# Patient Record
Sex: MALE | Race: WHITE | Employment: OTHER | URBAN - METROPOLITAN AREA
[De-identification: names, ages, dates, MRNs, and addresses within clinical notes are randomized per-mention and may not be internally consistent; named-entity substitution may affect disease eponyms.]

---

## 2018-09-20 ENCOUNTER — HOSPITAL ENCOUNTER (INPATIENT)
Age: 68
LOS: 1 days | Discharge: HOME | DRG: 309 | End: 2018-09-21
Attending: INTERNAL MEDICINE | Admitting: INTERNAL MEDICINE
Payer: COMMERCIAL

## 2018-09-20 ENCOUNTER — APPOINTMENT (OUTPATIENT)
Dept: GENERAL RADIOLOGY | Age: 68
End: 2018-09-20
Payer: COMMERCIAL

## 2018-09-20 ENCOUNTER — HOSPITAL ENCOUNTER (EMERGENCY)
Age: 68
Discharge: HOME | End: 2018-09-20
Attending: EMERGENCY MEDICINE
Payer: COMMERCIAL

## 2018-09-20 VITALS
OXYGEN SATURATION: 96 % | BODY MASS INDEX: 32.85 KG/M2 | TEMPERATURE: 97.4 F | HEART RATE: 73 BPM | RESPIRATION RATE: 13 BRPM | WEIGHT: 249 LBS | DIASTOLIC BLOOD PRESSURE: 73 MMHG | SYSTOLIC BLOOD PRESSURE: 139 MMHG

## 2018-09-20 DIAGNOSIS — R00.1 BRADYCARDIA: Primary | ICD-10-CM

## 2018-09-20 DIAGNOSIS — R55 NEAR SYNCOPE: ICD-10-CM

## 2018-09-20 PROBLEM — E78.5 HLD (HYPERLIPIDEMIA): Status: ACTIVE | Noted: 2018-09-20

## 2018-09-20 LAB
A/G RATIO: 1.3 (ref 1.1–2.2)
ALBUMIN SERPL-MCNC: 3.9 G/DL (ref 3.4–5)
ALP BLD-CCNC: 108 U/L (ref 40–129)
ALT SERPL-CCNC: 26 U/L (ref 10–40)
ANION GAP SERPL CALCULATED.3IONS-SCNC: 12 MMOL/L (ref 3–16)
AST SERPL-CCNC: 24 U/L (ref 15–37)
BASOPHILS ABSOLUTE: 0 K/UL (ref 0–0.2)
BASOPHILS RELATIVE PERCENT: 0.5 %
BILIRUB SERPL-MCNC: 0.9 MG/DL (ref 0–1)
BUN BLDV-MCNC: 34 MG/DL (ref 7–20)
CALCIUM SERPL-MCNC: 9.1 MG/DL (ref 8.3–10.6)
CHLORIDE BLD-SCNC: 103 MMOL/L (ref 99–110)
CO2: 23 MMOL/L (ref 21–32)
CREAT SERPL-MCNC: 1.7 MG/DL (ref 0.8–1.3)
EOSINOPHILS ABSOLUTE: 0.2 K/UL (ref 0–0.6)
EOSINOPHILS RELATIVE PERCENT: 2.1 %
GFR AFRICAN AMERICAN: 48
GFR NON-AFRICAN AMERICAN: 40
GLOBULIN: 3.1 G/DL
GLUCOSE BLD-MCNC: 153 MG/DL (ref 70–99)
HCT VFR BLD CALC: 49.8 % (ref 40.5–52.5)
HEMOGLOBIN: 17.2 G/DL (ref 13.5–17.5)
LYMPHOCYTES ABSOLUTE: 2.1 K/UL (ref 1–5.1)
LYMPHOCYTES RELATIVE PERCENT: 23.9 %
MCH RBC QN AUTO: 30.9 PG (ref 26–34)
MCHC RBC AUTO-ENTMCNC: 34.6 G/DL (ref 31–36)
MCV RBC AUTO: 89.2 FL (ref 80–100)
MONOCYTES ABSOLUTE: 0.3 K/UL (ref 0–1.3)
MONOCYTES RELATIVE PERCENT: 3.9 %
NEUTROPHILS ABSOLUTE: 6.1 K/UL (ref 1.7–7.7)
NEUTROPHILS RELATIVE PERCENT: 69.6 %
PDW BLD-RTO: 13.4 % (ref 12.4–15.4)
PLATELET # BLD: 286 K/UL (ref 135–450)
PMV BLD AUTO: 8.9 FL (ref 5–10.5)
POTASSIUM SERPL-SCNC: 3.4 MMOL/L (ref 3.5–5.1)
RBC # BLD: 5.58 M/UL (ref 4.2–5.9)
SODIUM BLD-SCNC: 138 MMOL/L (ref 136–145)
TOTAL PROTEIN: 7 G/DL (ref 6.4–8.2)
TROPONIN: <0.01 NG/ML
WBC # BLD: 8.8 K/UL (ref 4–11)

## 2018-09-20 PROCEDURE — 85025 COMPLETE CBC W/AUTO DIFF WBC: CPT

## 2018-09-20 PROCEDURE — 93010 ELECTROCARDIOGRAM REPORT: CPT | Performed by: INTERNAL MEDICINE

## 2018-09-20 PROCEDURE — 36415 COLL VENOUS BLD VENIPUNCTURE: CPT

## 2018-09-20 PROCEDURE — 80053 COMPREHEN METABOLIC PANEL: CPT

## 2018-09-20 PROCEDURE — 1200000000 HC SEMI PRIVATE

## 2018-09-20 PROCEDURE — 71046 X-RAY EXAM CHEST 2 VIEWS: CPT

## 2018-09-20 PROCEDURE — 2580000003 HC RX 258: Performed by: NURSE PRACTITIONER

## 2018-09-20 PROCEDURE — 84484 ASSAY OF TROPONIN QUANT: CPT

## 2018-09-20 PROCEDURE — G0378 HOSPITAL OBSERVATION PER HR: HCPCS

## 2018-09-20 PROCEDURE — 99285 EMERGENCY DEPT VISIT HI MDM: CPT

## 2018-09-20 PROCEDURE — 93005 ELECTROCARDIOGRAM TRACING: CPT | Performed by: EMERGENCY MEDICINE

## 2018-09-20 PROCEDURE — 6370000000 HC RX 637 (ALT 250 FOR IP): Performed by: NURSE PRACTITIONER

## 2018-09-20 RX ORDER — SODIUM CHLORIDE 9 MG/ML
INJECTION, SOLUTION INTRAVENOUS CONTINUOUS
Status: DISCONTINUED | OUTPATIENT
Start: 2018-09-20 | End: 2018-09-21 | Stop reason: HOSPADM

## 2018-09-20 RX ORDER — ONDANSETRON 2 MG/ML
4 INJECTION INTRAMUSCULAR; INTRAVENOUS EVERY 6 HOURS PRN
Status: DISCONTINUED | OUTPATIENT
Start: 2018-09-20 | End: 2018-09-21 | Stop reason: HOSPADM

## 2018-09-20 RX ORDER — ASPIRIN 81 MG/1
81 TABLET ORAL DAILY
Status: DISCONTINUED | OUTPATIENT
Start: 2018-09-20 | End: 2018-09-21 | Stop reason: HOSPADM

## 2018-09-20 RX ORDER — ATORVASTATIN CALCIUM 80 MG/1
80 TABLET, FILM COATED ORAL DAILY
Status: DISCONTINUED | OUTPATIENT
Start: 2018-09-21 | End: 2018-09-20

## 2018-09-20 RX ORDER — SODIUM CHLORIDE 0.9 % (FLUSH) 0.9 %
10 SYRINGE (ML) INJECTION PRN
Status: DISCONTINUED | OUTPATIENT
Start: 2018-09-20 | End: 2018-09-21 | Stop reason: HOSPADM

## 2018-09-20 RX ORDER — ASPIRIN 81 MG/1
81 TABLET ORAL DAILY
Status: DISCONTINUED | OUTPATIENT
Start: 2018-09-21 | End: 2018-09-20

## 2018-09-20 RX ORDER — SODIUM CHLORIDE 0.9 % (FLUSH) 0.9 %
10 SYRINGE (ML) INJECTION EVERY 12 HOURS SCHEDULED
Status: DISCONTINUED | OUTPATIENT
Start: 2018-09-20 | End: 2018-09-21 | Stop reason: HOSPADM

## 2018-09-20 RX ORDER — ATORVASTATIN CALCIUM 80 MG/1
80 TABLET, FILM COATED ORAL DAILY
Status: DISCONTINUED | OUTPATIENT
Start: 2018-09-20 | End: 2018-09-21 | Stop reason: HOSPADM

## 2018-09-20 RX ADMIN — SODIUM CHLORIDE: 9 INJECTION, SOLUTION INTRAVENOUS at 22:04

## 2018-09-20 RX ADMIN — ATORVASTATIN CALCIUM 80 MG: 80 TABLET, FILM COATED ORAL at 23:07

## 2018-09-20 RX ADMIN — ASPIRIN 81 MG: 81 TABLET ORAL at 23:07

## 2018-09-20 RX ADMIN — SODIUM CHLORIDE, PRESERVATIVE FREE 10 ML: 5 INJECTION INTRAVENOUS at 22:04

## 2018-09-20 NOTE — ED NOTES
1701-Transfer center paged Dr. Usman Gupta Hospitalist for Dr. Quincy Bledsoe.       Tayo  09/20/18 1704

## 2018-09-20 NOTE — ED NOTES
173-[facility] [facility] Hospitalist Dr. Earle Blizzard called back and is currently speaking to Dr. Joana Wilson regarding the pt.       Tayo  09/20/18 3732

## 2018-09-20 NOTE — ED NOTES
6412-Paged Dr. Albania Gaffney for Dr. Betina Rangel called back and spoke with Dr. Diana Valdez regarding the pt.       Tayo  09/20/18 [id number]

## 2018-09-20 NOTE — ED PROVIDER NOTES
SpO2 Height Weight   09/20/18 1438 09/20/18 1442 09/20/18 1442 09/20/18 1438 09/20/18 1438 09/20/18 1438 -- 09/20/18 1442   108/63 97.4 °F (36.3 °C) Oral 75 13 92 %  249 lb (112.9 kg)     General :Patient is awake alert oriented person place and time no acute distress nontoxic appearing  HEENT:  Pupils are equally round and reactive to light extraocular motors are intact conjunctivae clear sclerae white there is no injection no icterus. No obvious signs of papilledema. Nose without any rhinorrhea or epistaxis. Oral mucosa is moist no exudate buccal mucosa shows no ulcerations. Uvula is midline  Neck:  Neck is supple full range of motion trachea midline thyroid nonpalpable  Cardiac: Heart regular rate rhythm no murmurs rubs clicks or gallops, the patient does not report any sudden onset pain no tearing pain in chest abdomin or back, there is no migratory pain nor pulse inequalities in the bilateral femoral or radial pulses. there are no concurrent neurological symptoms   Lungs:  Lungs are clear to auscultation there is no wheezing rhonchi or rales. There is no use of accessory muscles no nasal flaring identified. Chest wall: There is no tenderness to palpation over the chest wall or over ribs  Abdomen:  Abdomen is soft nontender nondistended. There is no firm or pulsatile masses no rebound rigidity or guarding negative Enriquez's negative McBurney  Suprapubic;  there is no tenderness to palpation over the external bladder   Musculoskeletal:  5 out of 5 strength in all 4 extremities full flexion extension abduction and adduction supination pronation of all extremities and all digits. No obvious muscle atrophy is noted. No focal muscle deficits are appreciated. Posterior tibial artery is palpable pulses are intact equal bilaterally Refill less than 2 seconds lower extremity is warm and viable  Neuro:   Motor intact sensory intact cranial nerves II through XII are intact level of consciousness is normal Rate 74 BPM    P-R Interval 160 ms    QRS Duration 92 ms    Q-T Interval 404 ms    QTc Calculation (Bazett) 448 ms    P Axis 43 degrees    R Axis 44 degrees    T Axis 58 degrees    Diagnosis       Sinus rhythm with Premature supraventricular complexesOtherwise normal ECGConfirmed by Jody Mancini MD, Deondre Ga ([id number]) on 9/20/2018 3:37:38 PM     Radiographs:  Xr Chest Standard (2 Vw)    Result Date: 9/20/2018  EXAMINATION: TWO VIEWS OF THE CHEST 9/20/2018 2:52 pm COMPARISON: None. HISTORY: ORDERING SYSTEM PROVIDED HISTORY: cp TECHNOLOGIST PROVIDED HISTORY: Reason for exam:->cp Ordering Physician Provided Reason for Exam: Dizziness (pt was outside playing golf today. pt got dizzy and hot and wasnt feeling well. squad states pt had a near syncopal episode en route and dwight'd down to 36. given atropine. pt arrives nsr with hr 74. a/ox4) Acuity: Acute Type of Exam: Initial FINDINGS: The cardiac silhouette, mediastinal and hilar contours are normal.  The lungs are clear. There are no pleural effusions. No acute osseous abnormalities are identified. There is moderate spondylosis in the mid and lower thoracic spine. No acute cardiopulmonary disease. Procedures/EKG:   EKG interpreted by myself shows sinus rhythm with PVCs no acute ST elevation is identified no old EKG available for comparison    ED Course and MDM   In brief, Susan Novak is a 77 y.o. male who presented to the emergency department With near syncope patient was bradycardic with EMS arrival and 36 atropine now feels improved. CBC is normal CMP is grossly normal BUN 34 creatinine 1.7 troponin is negative chest x-rays negative case discussed with cardiologist Dr. Orlando Freed percent patient transferred to Hospital observation for possible pacemaker placement.   Patient transferred in stable condition  CRITICAL CARE TIME    CRITICAL CARE STATEMENT:   There was a high probability of life-threatening clinical deterioration in the patient's condition requiring my urgent intervention. The services I provided to this patient were to treat and/or prevent clinically significant deterioration that could result in:   __respiratory failure   __neurologic failure   _X_circulatory failure/ACS/ Chest pain/ Unstable angina  __ Sever Blood pressure abnormality requiring intervention   __overwhelming infection/ possible urgent surgical situation   __renal failure   __severe shock   __metabolic failure   __multi-organ failure   __preventing self harm/harm to staff or other Psychiatric  Services included the following: chart data review, reviewing nursing notes and/or old charts, documentation time, consultant collaboration regarding findings and treatment options, medication orders and management, direct patient care, re-evaluations, vital sign assessments and ordering, interpreting and reviewing diagnostic studies/lab tests. Aggregate critical care time was 35 minutes, which includes only time during which I was engaged in work directly related to the patient's care, as described above, whether at the bedside or elsewhere in the Emergency Department, or in consultation with specialist. It did not include time spent performing other reported procedures or the services of residents, students, nurses or physician assistants and is in addition to and exclusion of all other seperatly billable charges and procedures. This does include blood pressure monitoring, monitoring of other vital signs as well as frequent patient reassessment for any deterioration in their condition. ED Medication Orders     None          Final Impression      1. Bradycardia    2.  Near syncope      DISPOSITION Decision To Admit 09/20/2018 04:00:32 PM     (Please note that portions of this note may have been completed with a voice recognition program. Efforts were made to edit the dictations but occasionally words are mis-transcribed.)    Rory Hollingsworth,

## 2018-09-20 NOTE — ED NOTES
Bed: 01  Expected date:   Expected time:   Means of arrival:   Comments:  Joss Choudhary  09/20/18 1428

## 2018-09-21 VITALS
TEMPERATURE: 97.9 F | HEIGHT: 73 IN | WEIGHT: 253 LBS | DIASTOLIC BLOOD PRESSURE: 79 MMHG | RESPIRATION RATE: 16 BRPM | SYSTOLIC BLOOD PRESSURE: 122 MMHG | BODY MASS INDEX: 33.53 KG/M2 | OXYGEN SATURATION: 95 % | HEART RATE: 61 BPM

## 2018-09-21 DIAGNOSIS — R00.1 BRADYCARDIA: Primary | ICD-10-CM

## 2018-09-21 LAB
ANION GAP SERPL CALCULATED.3IONS-SCNC: 10 MMOL/L (ref 3–16)
BASOPHILS ABSOLUTE: 0.1 K/UL (ref 0–0.2)
BASOPHILS RELATIVE PERCENT: 0.7 %
BUN BLDV-MCNC: 34 MG/DL (ref 7–20)
CALCIUM SERPL-MCNC: 8.2 MG/DL (ref 8.3–10.6)
CHLORIDE BLD-SCNC: 105 MMOL/L (ref 99–110)
CO2: 23 MMOL/L (ref 21–32)
CREAT SERPL-MCNC: 1.3 MG/DL (ref 0.8–1.3)
EKG ATRIAL RATE: 74 BPM
EKG DIAGNOSIS: NORMAL
EKG P AXIS: 43 DEGREES
EKG P-R INTERVAL: 160 MS
EKG Q-T INTERVAL: 404 MS
EKG QRS DURATION: 92 MS
EKG QTC CALCULATION (BAZETT): 448 MS
EKG R AXIS: 44 DEGREES
EKG T AXIS: 58 DEGREES
EKG VENTRICULAR RATE: 74 BPM
EOSINOPHILS ABSOLUTE: 0.4 K/UL (ref 0–0.6)
EOSINOPHILS RELATIVE PERCENT: 4.8 %
GFR AFRICAN AMERICAN: >60
GFR NON-AFRICAN AMERICAN: 54
GLUCOSE BLD-MCNC: 100 MG/DL (ref 70–99)
HCT VFR BLD CALC: 42.2 % (ref 40.5–52.5)
HEMOGLOBIN: 14.5 G/DL (ref 13.5–17.5)
LV EF: 58 %
LVEF MODALITY: NORMAL
LYMPHOCYTES ABSOLUTE: 1.8 K/UL (ref 1–5.1)
LYMPHOCYTES RELATIVE PERCENT: 20.5 %
MCH RBC QN AUTO: 30.5 PG (ref 26–34)
MCHC RBC AUTO-ENTMCNC: 34.2 G/DL (ref 31–36)
MCV RBC AUTO: 89 FL (ref 80–100)
MONOCYTES ABSOLUTE: 0.4 K/UL (ref 0–1.3)
MONOCYTES RELATIVE PERCENT: 4.2 %
NEUTROPHILS ABSOLUTE: 6.1 K/UL (ref 1.7–7.7)
NEUTROPHILS RELATIVE PERCENT: 69.8 %
PDW BLD-RTO: 13.2 % (ref 12.4–15.4)
PLATELET # BLD: 202 K/UL (ref 135–450)
PMV BLD AUTO: 7.9 FL (ref 5–10.5)
POTASSIUM REFLEX MAGNESIUM: 3.9 MMOL/L (ref 3.5–5.1)
RBC # BLD: 4.75 M/UL (ref 4.2–5.9)
SODIUM BLD-SCNC: 138 MMOL/L (ref 136–145)
TROPONIN: <0.01 NG/ML
TROPONIN: <0.01 NG/ML
WBC # BLD: 8.7 K/UL (ref 4–11)

## 2018-09-21 PROCEDURE — 36415 COLL VENOUS BLD VENIPUNCTURE: CPT

## 2018-09-21 PROCEDURE — 6370000000 HC RX 637 (ALT 250 FOR IP): Performed by: NURSE PRACTITIONER

## 2018-09-21 PROCEDURE — 99223 1ST HOSP IP/OBS HIGH 75: CPT | Performed by: INTERNAL MEDICINE

## 2018-09-21 PROCEDURE — G0378 HOSPITAL OBSERVATION PER HR: HCPCS

## 2018-09-21 PROCEDURE — 93880 EXTRACRANIAL BILAT STUDY: CPT

## 2018-09-21 PROCEDURE — 84484 ASSAY OF TROPONIN QUANT: CPT

## 2018-09-21 PROCEDURE — 80048 BASIC METABOLIC PNL TOTAL CA: CPT

## 2018-09-21 PROCEDURE — 93306 TTE W/DOPPLER COMPLETE: CPT | Performed by: INTERNAL MEDICINE

## 2018-09-21 PROCEDURE — 85025 COMPLETE CBC W/AUTO DIFF WBC: CPT

## 2018-09-21 RX ADMIN — ASPIRIN 81 MG: 81 TABLET ORAL at 08:16

## 2018-09-21 ASSESSMENT — PAIN SCALES - GENERAL
PAINLEVEL_OUTOF10: 0

## 2018-09-21 NOTE — DISCHARGE SUMMARY
------------------------------------------------------------------  Blood Pressure:Right arm 146/65 mmHg. Left arm 150/78 mmHg. Patient Status:Routine. Study [facility] - Vascular Lab. Technical Quality:Adequate visualization. Risk Factors History +----------------------+----------+------------------------------------------+ ! Diagnosis             ! Date      ! Comments                                  ! +----------------------+----------+------------------------------------------+ ! Carotid Endarterectomy!02/24/2015! Right                                     ! +----------------------+----------+------------------------------------------+ ! Other                 !09/21/2018! Presented to ED yesterday after becoming  ! !                      !          !dizzy and diaphoretic. Patient had near   ! !                      !          !syncopal episode en route to ED. ! +----------------------+----------+------------------------------------------+ ! Other                 ! !4-: ICA RT 1 - 15%, ICA LT 50 -    ! !                      !          !80%, ECA LT > 50%                         ! +----------------------+----------+------------------------------------------+   - The patient's risk factor(s) include: dyslipidemia and arterial     hypertension. Plaque   - A plaque was found in the Right Prox ICA. The plaque characteristics are: minimal severity and homogeneous texture. - A plaque was found in the Left Prox ICA. The plaque characteristics are: moderate severity and heterogeneous texture. There is evidence of calcified plaque. - A plaque was found in the Left Dist CCA. The plaque characteristics are: moderate severity and heterogeneous texture. There is evidence of calcified plaque. Velocities are measured in cm/s ; Diameters are measured in mm Carotid Right Measurements +---------------+----+----+-----+----+ ! Location       ! PSV ! EDV ! Angle! RI  ! +---------------+----+----+-----+----+ ! Prox CCA       !143 !17. 2! 60   !0.88! +---------------+----+----+-----+----+ ! Mid CCA        !113 !23. 5!60   !0.79! +---------------+----+----+-----+----+ ! Dist CCA       !98. 3!17. 7! 60   !0.82! +---------------+----+----+-----+----+ ! Prox ICA       !83.2!22. 4!60   !0.73! +---------------+----+----+-----+----+ ! Mid ICA        !93.8!29. 8!60   !0.68! +---------------+----+----+-----+----+ ! Dist ICA       !95.7!32. 9!60   !0.66! +---------------+----+----+-----+----+ ! Prox ECA       !129 !    !61   !    ! +---------------+----+----+-----+----+ ! Vertebral      !20  !7.6 ! 60   !0.62! +---------------+----+----+-----+----+ ! Prox Subclavian! 150 !    !60   !    ! +---------------+----+----+-----+----+   - There is antegrade vertebral flow noted on the right side. - Additional Measurements:ICAPSV/CCAPSV 0.85. ICAEDV/CCAEDV 1.91. Carotid Left Measurements +---------------+----+----+-----+----+ ! Location       ! PSV ! EDV ! Angle! RI  ! +---------------+----+----+-----+----+ ! Prox CCA       !116 !18. 6! 60   !0.84! +---------------+----+----+-----+----+ ! Mid CCA        !99.4!21. 7!60   !0.78! +---------------+----+----+-----+----+ ! Dist CCA       !114 !23. 5!60   !0.79! +---------------+----+----+-----+----+ ! Prox ICA       ! 147 !28. 1! 60   !0.81! +---------------+----+----+-----+----+ ! Mid ICA        !103 !21. 2! 60   !0.79! +---------------+----+----+-----+----+ ! Dist ICA       !80.6!26. 5!60   !0.67! +---------------+----+----+-----+----+ ! Prox ECA       !339 !    !61   !    ! +---------------+----+----+-----+----+ ! Vertebral      !28. 4!10. 7! 60   !0.62! +---------------+----+----+-----+----+ ! Prox Subclavian! 108 !    !60   !    ! +---------------+----+----+-----+----+   - There is antegrade vertebral flow noted on the left side. - Additional Measurements:ICAPSV/CCAPSV 1.48. ICAEDV/CCAEDV 1.51. Consults:     IP CONSULT TO CARDIOLOGY    Labs:  For convenience and continuity at follow-up the

## 2018-09-21 NOTE — H&P
Prophylaxis: Lovenox  Diet: DIET CARDIAC;  Code Status: Full Code    PT/OT Eval Status: No indication for need at this time    Dispo - 1-2 days pending clinical improvement       FAINA Jung - CNP    Thank you Lin Velásquez for the opportunity to be involved in this patient's care.  If you have any questions or concerns please feel free to contact me at [phone].  --------------------------------Dr. Bk Oswald-----------------------------------------------

## 2018-09-21 NOTE — CONSULTS
Extraocular muscles are intact. NECK:  Supple. Carotids are brisk. There is left carotid bruit. There is  no lymphadenopathy or thyromegaly. LUNGS:  Clear to auscultation. Percussion is resonant. HEART:  S1 and S2.  Regular rate and rhythm. There is no murmur. There is  no gallop. PMI is nondisplaced. ABDOMEN:  Positive bowel sounds. Soft and nontender. There is no  organomegaly. SKIN:  No skin rash, no pigmentation. Normal skin texture and turgor. EXTREMITIES:  There is no edema. There is no cyanosis. There is no  deformity in the hand or feet. PSYCHIATRIC:  Normal mood and affect. Alert and oriented x3. NEUROLOGIC:  Normal gross motor and sensory exam.      DIAGNOSTIC WORKUP:  Potassium 3.9, creatinine is 1.3, creatinine yesterday  was 1.7, BUN is 34. Hemoglobin is 14, hematocrit 42, platelets are 535 and  WBCs 8.7. EKG done yesterday revealed a sinus rhythm with premature supraventricular  complexes. Chest x-ray revealed no acute cardiopulmonary process. ASSESSMENT:  1. Transient bradycardia and presyncope due to volume depletion. He was  dehydrated with BUN of 34 and creatinine of 1.7, which has improved after  hydration. 2.  Carotid artery stenosis, s/p right carotid endarterectomy and moderate  disease of left carotid. 3.  Hyperlipidemia. RECOMMENDATION:  I will suggest we place the patient on a 30-day event  monitor to rule out marked bradycardia that could be causing his symptom. Will also obtain echocardiogram to rule out structural heart disease. He  has been hydrated and his creatinine has improved. It looks like he has  chronic kidney disease as his creatinine is normally 1.3. Will also  obtain a surveillance duplex for the carotid disease. Thank you for the consultation. We will continue to follow the patient  with you.         Vanessa Weaver MD    D: 09/21/2018 11:48:17       T: 09/21/2018 12:50:10     [id number]  Job#: [id number]     Doc#: [id number]    CC:  Marylu Lawton MD

## 2018-11-02 ENCOUNTER — OFFICE VISIT (OUTPATIENT)
Dept: CARDIOLOGY CLINIC | Age: 68
End: 2018-11-02
Payer: COMMERCIAL

## 2018-11-02 VITALS
BODY MASS INDEX: 33.56 KG/M2 | SYSTOLIC BLOOD PRESSURE: 136 MMHG | WEIGHT: 253.2 LBS | DIASTOLIC BLOOD PRESSURE: 80 MMHG | HEIGHT: 73 IN | OXYGEN SATURATION: 96 % | HEART RATE: 69 BPM

## 2018-11-02 DIAGNOSIS — R00.1 BRADYCARDIA: Primary | ICD-10-CM

## 2018-11-02 DIAGNOSIS — I77.9 BILATERAL CAROTID ARTERY DISEASE, UNSPECIFIED TYPE (HCC): ICD-10-CM

## 2018-11-02 PROCEDURE — 99214 OFFICE O/P EST MOD 30 MIN: CPT | Performed by: INTERNAL MEDICINE

## 2018-11-02 NOTE — LETTER
This note was scribed in the presence of Morgan Rodriguez MD, by Felice Melendrez RN.      I, Dr. Morgan Rodriguez, personally performed the services described in this documentation, as scribed by the above signed scribe in my presence. It is both accurate and complete to my knowledge. I agree with the details independently gathered by the clinical support staff, while the remaining scribed note accurately describes my personal service to the patient. All questions and concerns were addressed to the patient/family. Alternatives to my treatment were discussed. The note was completed using EMR. Every effort was made to ensure accuracy; however, inadvertent computerized transcription errors may be present. Morgan Rodriguez MD, [facility] [address], [place], [place]  [phone] [facility]  [phone] [facility]  11/2/2018  10:10 AM       If you have questions, please do not hesitate to call me. I look forward to following Pedrito Nino along with you.     Sincerely,        Morgan Rodriguez MD

## 2018-11-02 NOTE — PROGRESS NOTES
Date Taking? Authorizing Provider   aspirin 81 MG tablet Take 81 mg by mouth daily. Yes Historical Provider, MD   atorvastatin (LIPITOR) 80 MG tablet Take 80 mg by mouth daily. Yes Historical Provider, MD       In-patient schedule medications:        Infusion Medications: Allergies:  Patient has no known allergies. Review of Systems:   All 14 point review of symptoms completed. Pertinent positives identified in the HPI, all other review of symptoms findings as below.      Review of Systems - History obtained from the patient  General ROS: negative for - chills, fever or night sweats  Psychological ROS: negative for - disorientation or hallucinations  Ophthalmic ROS: negative for - dry eyes, eye pain or loss of vision  ENT ROS: negative for - nasal discharge or sore throat  Allergy and Immunology ROS: negative for - hives or itchy/watery eyes  Hematological and Lymphatic ROS: negative for - jaundice or night sweats  Endocrine ROS: negative for - mood swings or temperature intolerance  Breast ROS: deferred  Respiratory ROS: negative for - hemoptysis or stridor  Cardiovascular ROS: negative for - chest pain, dyspnea on exertion or palpitations  Gastrointestinal ROS: no abdominal pain, change in bowel habits, or black or bloody stools  Genito-Urinary ROS: no dysuria, trouble voiding, or hematuria  Musculoskeletal ROS: negative for - gait disturbance, joint pain or joint stiffness  Neurological ROS: negative for - seizures or speech problems  Dermatological ROS: negative for - rash or skin lesion changes      Physical Examination:    /80   Pulse 69   Ht 6' 1\" (1.854 m)   Wt 253 lb 3.2 oz (114.9 kg)   SpO2 96%   BMI 33.41 kg/m²   /80   Pulse 69   Ht 6' 1\" (1.854 m)   Wt 253 lb 3.2 oz (114.9 kg)   SpO2 96%   BMI 33.41 kg/m²    Weight: 253 lb 3.2 oz (114.9 kg)     Wt Readings from Last 3 Encounters:   11/02/18 253 lb 3.2 oz (114.9 kg)   09/21/18 253 lb (114.8 kg)   09/20/18 249 lb (112.9

## 2022-07-19 ENCOUNTER — HOSPITAL ENCOUNTER (OUTPATIENT)
Age: 72
Setting detail: OUTPATIENT SURGERY
Discharge: HOME | End: 2022-07-19
Attending: INTERNAL MEDICINE | Admitting: INTERNAL MEDICINE
Payer: COMMERCIAL

## 2022-07-19 ENCOUNTER — ANESTHESIA EVENT (OUTPATIENT)
Dept: ENDOSCOPY | Age: 72
End: 2022-07-19
Payer: COMMERCIAL

## 2022-07-19 ENCOUNTER — ANESTHESIA (OUTPATIENT)
Dept: ENDOSCOPY | Age: 72
End: 2022-07-19
Payer: COMMERCIAL

## 2022-07-19 VITALS
RESPIRATION RATE: 18 BRPM | BODY MASS INDEX: 32.47 KG/M2 | WEIGHT: 245 LBS | HEIGHT: 73 IN | OXYGEN SATURATION: 93 % | SYSTOLIC BLOOD PRESSURE: 134 MMHG | TEMPERATURE: 97.4 F | DIASTOLIC BLOOD PRESSURE: 81 MMHG | HEART RATE: 60 BPM

## 2022-07-19 DIAGNOSIS — Z12.11 ENCOUNTER FOR SCREENING COLONOSCOPY: ICD-10-CM

## 2022-07-19 PROCEDURE — 6360000002 HC RX W HCPCS: Performed by: NURSE ANESTHETIST, CERTIFIED REGISTERED

## 2022-07-19 PROCEDURE — 7100000010 HC PHASE II RECOVERY - FIRST 15 MIN: Performed by: INTERNAL MEDICINE

## 2022-07-19 PROCEDURE — 2580000003 HC RX 258: Performed by: ANESTHESIOLOGY

## 2022-07-19 PROCEDURE — 2709999900 HC NON-CHARGEABLE SUPPLY: Performed by: INTERNAL MEDICINE

## 2022-07-19 PROCEDURE — 7100000011 HC PHASE II RECOVERY - ADDTL 15 MIN: Performed by: INTERNAL MEDICINE

## 2022-07-19 PROCEDURE — 3700000001 HC ADD 15 MINUTES (ANESTHESIA): Performed by: INTERNAL MEDICINE

## 2022-07-19 PROCEDURE — 88305 TISSUE EXAM BY PATHOLOGIST: CPT

## 2022-07-19 PROCEDURE — 3609010600 HC COLONOSCOPY POLYPECTOMY SNARE/COLD BIOPSY: Performed by: INTERNAL MEDICINE

## 2022-07-19 PROCEDURE — 3700000000 HC ANESTHESIA ATTENDED CARE: Performed by: INTERNAL MEDICINE

## 2022-07-19 RX ORDER — SODIUM CHLORIDE, SODIUM LACTATE, POTASSIUM CHLORIDE, CALCIUM CHLORIDE 600; 310; 30; 20 MG/100ML; MG/100ML; MG/100ML; MG/100ML
INJECTION, SOLUTION INTRAVENOUS CONTINUOUS
Status: DISCONTINUED | OUTPATIENT
Start: 2022-07-19 | End: 2022-07-19 | Stop reason: HOSPADM

## 2022-07-19 RX ORDER — LIDOCAINE HYDROCHLORIDE 20 MG/ML
INJECTION, SOLUTION INTRAVENOUS PRN
Status: DISCONTINUED | OUTPATIENT
Start: 2022-07-19 | End: 2022-07-19 | Stop reason: SDUPTHER

## 2022-07-19 RX ORDER — PROPOFOL 10 MG/ML
INJECTION, EMULSION INTRAVENOUS PRN
Status: DISCONTINUED | OUTPATIENT
Start: 2022-07-19 | End: 2022-07-19 | Stop reason: SDUPTHER

## 2022-07-19 RX ORDER — PROPOFOL 10 MG/ML
INJECTION, EMULSION INTRAVENOUS CONTINUOUS PRN
Status: DISCONTINUED | OUTPATIENT
Start: 2022-07-19 | End: 2022-07-19 | Stop reason: SDUPTHER

## 2022-07-19 RX ORDER — SODIUM CHLORIDE 9 MG/ML
INJECTION, SOLUTION INTRAVENOUS PRN
Status: DISCONTINUED | OUTPATIENT
Start: 2022-07-19 | End: 2022-07-19 | Stop reason: HOSPADM

## 2022-07-19 RX ORDER — LIDOCAINE HYDROCHLORIDE 10 MG/ML
1 INJECTION, SOLUTION EPIDURAL; INFILTRATION; INTRACAUDAL; PERINEURAL
Status: DISCONTINUED | OUTPATIENT
Start: 2022-07-19 | End: 2022-07-19 | Stop reason: HOSPADM

## 2022-07-19 RX ORDER — SODIUM CHLORIDE 0.9 % (FLUSH) 0.9 %
5-40 SYRINGE (ML) INJECTION EVERY 12 HOURS SCHEDULED
Status: DISCONTINUED | OUTPATIENT
Start: 2022-07-19 | End: 2022-07-19 | Stop reason: HOSPADM

## 2022-07-19 RX ORDER — SODIUM CHLORIDE 0.9 % (FLUSH) 0.9 %
5-40 SYRINGE (ML) INJECTION PRN
Status: DISCONTINUED | OUTPATIENT
Start: 2022-07-19 | End: 2022-07-19 | Stop reason: HOSPADM

## 2022-07-19 RX ADMIN — SODIUM CHLORIDE, SODIUM LACTATE, POTASSIUM CHLORIDE, AND CALCIUM CHLORIDE: .6; .31; .03; .02 INJECTION, SOLUTION INTRAVENOUS at 08:24

## 2022-07-19 RX ADMIN — PROPOFOL 20 MG: 10 INJECTION, EMULSION INTRAVENOUS at 09:06

## 2022-07-19 RX ADMIN — PROPOFOL 120 MCG/KG/MIN: 10 INJECTION, EMULSION INTRAVENOUS at 08:37

## 2022-07-19 RX ADMIN — PROPOFOL 20 MG: 10 INJECTION, EMULSION INTRAVENOUS at 08:50

## 2022-07-19 RX ADMIN — PROPOFOL 20 MG: 10 INJECTION, EMULSION INTRAVENOUS at 09:09

## 2022-07-19 RX ADMIN — LIDOCAINE HYDROCHLORIDE 100 MG: 20 INJECTION, SOLUTION INTRAVENOUS at 08:37

## 2022-07-19 RX ADMIN — PROPOFOL 60 MG: 10 INJECTION, EMULSION INTRAVENOUS at 08:37

## 2022-07-19 ASSESSMENT — LIFESTYLE VARIABLES: SMOKING_STATUS: 0

## 2022-07-19 ASSESSMENT — PAIN - FUNCTIONAL ASSESSMENT
PAIN_FUNCTIONAL_ASSESSMENT: 0-10
PAIN_FUNCTIONAL_ASSESSMENT: NONE - DENIES PAIN
PAIN_FUNCTIONAL_ASSESSMENT: 0-10

## 2022-07-19 NOTE — H&P
Pre-operative History and Physical    Patient:  Dwight  : 1950     History Obtained From:  patient    HISTORY OF PRESENT ILLNESS:    The patient is a 74 y.o. male who presents for a colonoscopy for hx polyps. Past Medical History:        Diagnosis Date    Carotid artery stenosis     Hyperlipidemia     Osteoarthritis      Past Surgical History:        Procedure Laterality Date    CAROTID ENDARTERECTOMY      CATARACT REMOVAL Bilateral     KNEE SURGERY       Medications Prior to Admission:   No current facility-administered medications on file prior to encounter. Current Outpatient Medications on File Prior to Encounter   Medication Sig Dispense Refill    aspirin 81 MG tablet Take 81 mg by mouth daily. atorvastatin (LIPITOR) 80 MG tablet Take 80 mg by mouth daily. Allergies:  Patient has no known allergies. History of allergic reaction to anesthesia:  No    PHYSICAL EXAM:      BP (!) 178/92   Pulse 79   Temp 97.8 °F (36.6 °C) (Temporal)   Resp 20   Ht 6' 1\" (1.854 m)   Wt 245 lb (111.1 kg)   SpO2 94%   BMI 32.32 kg/m²  I        Heart:  No m/r/g +s1/s2 RRR    Lungs:  CTA bilaterally    Abdomen:  Soft, nontender, non distended; +bs    ASA Grade:  ASA 2 - Patient with mild systemic disease with no functional limitations    Mallampati Class: 3    ASSESSMENT AND PLAN:    1. Patient is a 74 y.o. male here for colonoscopy with deep sedation  2. Procedure options, risks and benefits reviewed with patient. We specifically discussed that risks include, but are not limited to infection, bleeding, perforation, death, and missed lesions. Patient expresses understanding.

## 2022-07-19 NOTE — DISCHARGE INSTRUCTIONS
ENDOSCOPY DISCHARGE INSTRUCTIONS:    Call the physician that did your procedure for any questions or concern:    [facility]: [phone]  DR. STORM SHEPPARD      ACTIVITY:    There are potential side effects to the medications used for sedation and anesthesia during your procedure. These include:  Dizziness or light-headedness, confusion or memory loss, delayed reaction times, loss of coordination, nausea and vomiting. Because of your increased risk for injury, we ask that you observe the following precautions: For the next 24 hours,  DO NOT operate an automobile, bicycle, motorcycle, , power tools or large equipment of any kind. Do not drink alcohol, sign any legal documents or make any legal decisions for 24 hours. Do not bend your head over lower than your heart. DO sit on the side of bed/couch awhile before getting up. Plan on bedrest or quiet relaxation today. You may resume normal activities in 24 hours. DIET:    Your first meal today should be light, avoiding spicy and fatty foods. If you tolerate this first meal, then you may advance to your regular diet unless otherwise advised by your physician. NORMAL SYMPTOMS:  -Mild sore throat if youve had an EGD   -Gaseous discomfort    NOTIFY YOUR PHYSICIAN IF THESE SYMPTOMS OCCUR:  1. Fever (greater than 100)  5. Increased abdominal bloating  2. Severe pain    6. Excessive bleeding  3. Nausea and vomiting  7. Chest pain                                                                    4. Chills    8. Shortness of breath    ADDITIONAL INSTRUCTIONS:    Biopsy results: Call [address],[facility] for biopsy results in 1 week    Educational Information: Colon Polyps    Please review these discharge instructions this evening or tomorrow for  information you may have forgotten. We want to thank you for choosing the [facility] as your health care provider. We always strive to provide you with excellent care while you are here.  You may receive a survey in the mail regarding your care. We would appreciate you taking a few minutes of your time to complete this survey.

## 2022-07-19 NOTE — PROCEDURES
withdrawn (>6minutes) with close inspection of the mucosa in a circumferential manor. 5 mm ascending polyp, cold snared. 3, 7-8 mm transverse polyps, cold snared, 4, 2-6 mm left polyps, cold snared or biopsied and removed. Moderate left sided diverticulosis. Retroflexed views of the rectum showed small internal hemorrhoids    No immediate complications. The preparation was good. Estimated blood loss trace    Impression:  8 polyps removed as above  Moderate left sided diverticulosis  Small internal hemorrhoids    Plan:  The patient is aware it is their responsibility to call for biopsy results in 7 days.   Repeat colonoscopy in 3 years pending path results

## 2022-07-19 NOTE — PROGRESS NOTES
Ambulatory Surgery/Procedure Discharge Note    Patient tolerated procedure well. Patient denies nausea, cramping or pain post procedure. Discharge instructions and education reviewed with patient and daughter. Written instructions provided at discharge. Patient discharged ambulatory in wheelchair to car. Daughter to drive pt home. Vitals:    07/19/22 0940   BP: 134/81   Pulse: 60   Resp: 18   Temp:    SpO2: 93%       In: 600 [I.V.:600]  Out: -     Restroom use offered before discharge. Yes    Pain assessment:  none  Pain Level: 0        Patient discharged to home/self care.  Patient discharged via wheel chair by transporter to waiting family/S.O.       7/19/2022 1005 AM

## 2022-07-19 NOTE — ANESTHESIA PRE PROCEDURE
consumption: 07/18/22    BMI:   Wt Readings from Last 3 Encounters:   07/19/22 245 lb (111.1 kg)   11/02/18 253 lb 3.2 oz (114.9 kg)   09/21/18 253 lb (114.8 kg)     Body mass index is 32.32 kg/m². CBC:   Lab Results   Component Value Date/Time    WBC 8.7 09/21/2018 04:39 AM    RBC 4.75 09/21/2018 04:39 AM    HGB 14.5 09/21/2018 04:39 AM    HCT 42.2 09/21/2018 04:39 AM    MCV 89.0 09/21/2018 04:39 AM    RDW 13.2 09/21/2018 04:39 AM     09/21/2018 04:39 AM       CMP:   Lab Results   Component Value Date/Time     09/21/2018 04:39 AM    K 3.9 09/21/2018 04:39 AM     09/21/2018 04:39 AM    CO2 23 09/21/2018 04:39 AM    BUN 34 09/21/2018 04:39 AM    CREATININE 1.3 09/21/2018 04:39 AM    GFRAA >60 09/21/2018 04:39 AM    AGRATIO 1.3 09/20/2018 02:45 PM    LABGLOM 54 09/21/2018 04:39 AM    GLUCOSE 100 09/21/2018 04:39 AM    PROT 7.0 09/20/2018 02:45 PM    CALCIUM 8.2 09/21/2018 04:39 AM    BILITOT 0.9 09/20/2018 02:45 PM    ALKPHOS 108 09/20/2018 02:45 PM    AST 24 09/20/2018 02:45 PM    ALT 26 09/20/2018 02:45 PM       POC Tests: No results for input(s): POCGLU, POCNA, POCK, POCCL, POCBUN, POCHEMO, POCHCT in the last 72 hours.     Coags: No results found for: PROTIME, INR, APTT    HCG (If Applicable): No results found for: PREGTESTUR, PREGSERUM, HCG, HCGQUANT     ABGs: No results found for: PHART, PO2ART, WUP7VFA, DLY4JZJ, BEART, T1IOCNNT     Type & Screen (If Applicable):  No results found for: LABABO, LABRH    Drug/Infectious Status (If Applicable):  No results found for: HIV, HEPCAB    COVID-19 Screening (If Applicable): No results found for: COVID19        Anesthesia Evaluation    Airway: Mallampati: III  TM distance: >3 FB   Neck ROM: full  Mouth opening: > = 3 FB   Dental: normal exam         Pulmonary: breath sounds clear to auscultation      (-) COPD, asthma, sleep apnea and not a current smoker                           Cardiovascular:    (+) dysrhythmias (dwight):, hyperlipidemia    (-) hypertension,  angina,  CHF and orthopnea      Rhythm: regular  Rate: normal                    Neuro/Psych:      (-) seizures and TIA           GI/Hepatic/Renal:        (-) GERD, hepatitis, liver disease and no renal disease       Endo/Other:    (+) : arthritis:., no malignancy/cancer. (-) diabetes mellitus, hypothyroidism, hyperthyroidism, no malignancy/cancer               Abdominal:             Vascular:   + PVD, aortic or cerebral (bilateral carotid artery disease s/p endarterectomy), . Other Findings:           Anesthesia Plan      MAC     ASA 2       Induction: intravenous. Anesthetic plan and risks discussed with patient. Plan discussed with CRNA.                     Bob Wilburn MD   7/19/2022

## 2022-07-19 NOTE — ANESTHESIA POSTPROCEDURE EVALUATION
Department of Anesthesiology  Postprocedure Note    Patient: Kathi Nur  MRN: [id number]  YOB: 1950  Date of evaluation: 7/19/2022      Procedure Summary     Date: 07/19/22 Room / Location: [address] [place] [address] Diana SPEARS  / [facility]    Anesthesia Start: 7003 Anesthesia Stop: 1065    Procedure: COLONOSCOPY POLYPECTOMY SNARE/COLD BIOPSY Diagnosis:       Encounter for screening colonoscopy      (Encounter for screening colonoscopy [Z12.11])    Surgeons: Diana Moya MD Responsible Provider: Karina Shi MD    Anesthesia Type: MAC ASA Status: 2          Anesthesia Type: No value filed.     Arcadio Phase I: Arcadio Score: 10    Arcadio Phase II: Arcadio Score: 8      Anesthesia Post Evaluation    Patient location during evaluation: bedside  Level of consciousness: awake and alert  Airway patency: patent  Nausea & Vomiting: no vomiting  Complications: no  Cardiovascular status: blood pressure returned to baseline  Respiratory status: acceptable  Hydration status: euvolemic  Multimodal analgesia pain management approach

## (undated) DEVICE — TRAP SPEC RETRV CLR PLAS POLYP IN LN SUCT QUIK CTCH

## (undated) DEVICE — CANNULA SAMP CO2 AD GRN 7FT CO2 AND 7FT O2 TBNG UNIV CONN

## (undated) DEVICE — SNARES COLD OVAL 10MM THIN

## (undated) DEVICE — FORCEPS BX L240CM JAW DIA2.8MM L CAP W/ NDL MIC MESH TOOTH